# Patient Record
Sex: MALE | Race: WHITE | NOT HISPANIC OR LATINO
[De-identification: names, ages, dates, MRNs, and addresses within clinical notes are randomized per-mention and may not be internally consistent; named-entity substitution may affect disease eponyms.]

---

## 2020-04-23 PROBLEM — Z00.00 ENCOUNTER FOR PREVENTIVE HEALTH EXAMINATION: Status: ACTIVE | Noted: 2020-04-23

## 2020-06-29 ENCOUNTER — APPOINTMENT (OUTPATIENT)
Dept: CARDIOLOGY | Facility: CLINIC | Age: 70
End: 2020-06-29

## 2020-06-29 ENCOUNTER — APPOINTMENT (OUTPATIENT)
Dept: CARDIOLOGY | Facility: CLINIC | Age: 70
End: 2020-06-29
Payer: MEDICARE

## 2020-06-29 PROCEDURE — 93000 ELECTROCARDIOGRAM COMPLETE: CPT

## 2020-06-29 PROCEDURE — 99204 OFFICE O/P NEW MOD 45 MIN: CPT

## 2020-08-19 ENCOUNTER — APPOINTMENT (OUTPATIENT)
Dept: CARDIOLOGY | Facility: CLINIC | Age: 70
End: 2020-08-19
Payer: MEDICARE

## 2020-08-19 PROCEDURE — 93306 TTE W/DOPPLER COMPLETE: CPT

## 2020-08-24 ENCOUNTER — RESULT REVIEW (OUTPATIENT)
Age: 70
End: 2020-08-24

## 2020-08-24 ENCOUNTER — OUTPATIENT (OUTPATIENT)
Dept: OUTPATIENT SERVICES | Facility: HOSPITAL | Age: 70
LOS: 1 days | Discharge: HOME | End: 2020-08-24
Payer: MEDICARE

## 2020-08-24 DIAGNOSIS — R06.02 SHORTNESS OF BREATH: ICD-10-CM

## 2020-08-24 PROCEDURE — 78452 HT MUSCLE IMAGE SPECT MULT: CPT | Mod: 26

## 2020-10-01 ENCOUNTER — RECORD ABSTRACTING (OUTPATIENT)
Age: 70
End: 2020-10-01

## 2020-10-01 DIAGNOSIS — Z86.79 PERSONAL HISTORY OF OTHER DISEASES OF THE CIRCULATORY SYSTEM: ICD-10-CM

## 2020-10-01 DIAGNOSIS — Z86.69 PERSONAL HISTORY OF OTHER DISEASES OF THE NERVOUS SYSTEM AND SENSE ORGANS: ICD-10-CM

## 2020-10-01 DIAGNOSIS — R07.89 OTHER CHEST PAIN: ICD-10-CM

## 2020-10-01 DIAGNOSIS — Z78.9 OTHER SPECIFIED HEALTH STATUS: ICD-10-CM

## 2020-10-01 DIAGNOSIS — R94.30 ABNORMAL RESULT OF CARDIOVASCULAR FUNCTION STUDY, UNSPECIFIED: ICD-10-CM

## 2020-11-16 ENCOUNTER — APPOINTMENT (OUTPATIENT)
Dept: CARDIOLOGY | Facility: CLINIC | Age: 70
End: 2020-11-16
Payer: MEDICARE

## 2020-11-16 DIAGNOSIS — I10 ESSENTIAL (PRIMARY) HYPERTENSION: ICD-10-CM

## 2020-11-16 PROCEDURE — 93000 ELECTROCARDIOGRAM COMPLETE: CPT

## 2020-11-16 PROCEDURE — 99213 OFFICE O/P EST LOW 20 MIN: CPT

## 2020-11-16 NOTE — PHYSICAL EXAM
[General Appearance - Well Developed] : well developed [Normal Appearance] : normal appearance [Well Groomed] : well groomed [General Appearance - Well Nourished] : well nourished [No Deformities] : no deformities [General Appearance - In No Acute Distress] : no acute distress [Normal Conjunctiva] : the conjunctiva exhibited no abnormalities [Eyelids - No Xanthelasma] : the eyelids demonstrated no xanthelasmas [Normal Oral Mucosa] : normal oral mucosa [No Oral Pallor] : no oral pallor [No Oral Cyanosis] : no oral cyanosis [] : no respiratory distress [Heart Rate And Rhythm] : heart rate and rhythm were normal [Heart Sounds] : normal S1 and S2 [Murmurs] : no murmurs present [Arterial Pulses Normal] : the arterial pulses were normal [Edema] : no peripheral edema present [FreeTextEntry1] : No JVD

## 2020-11-16 NOTE — ASSESSMENT
[FreeTextEntry1] : HTN\par left nepherectomy\par Neuropathy\par Moh's surg on nose \par Lab Investigations	q waves III and F ? old no old EKG\par like to get old test\par stress thall(-)2019 and 2020   and echo 2020 aorta at sinus level 4.0 and MAC otherwise normal

## 2021-07-05 ENCOUNTER — RX RENEWAL (OUTPATIENT)
Age: 71
End: 2021-07-05

## 2022-06-30 ENCOUNTER — RX RENEWAL (OUTPATIENT)
Age: 72
End: 2022-06-30

## 2022-06-30 RX ORDER — AMLODIPINE BESYLATE 10 MG/1
10 TABLET ORAL DAILY
Qty: 90 | Refills: 3 | Status: ACTIVE | COMMUNITY
Start: 2021-07-05 | End: 1900-01-01

## 2022-07-05 ENCOUNTER — APPOINTMENT (OUTPATIENT)
Dept: ORTHOPEDIC SURGERY | Facility: CLINIC | Age: 72
End: 2022-07-05

## 2023-03-04 ENCOUNTER — APPOINTMENT (OUTPATIENT)
Dept: ORTHOPEDIC SURGERY | Facility: CLINIC | Age: 73
End: 2023-03-04
Payer: MEDICARE

## 2023-03-04 PROCEDURE — 73560 X-RAY EXAM OF KNEE 1 OR 2: CPT | Mod: LT

## 2023-03-04 PROCEDURE — 73610 X-RAY EXAM OF ANKLE: CPT | Mod: RT

## 2023-03-04 PROCEDURE — 99213 OFFICE O/P EST LOW 20 MIN: CPT

## 2023-03-04 NOTE — HISTORY OF PRESENT ILLNESS
[de-identified] : [age] several months of pain left knee going up and down stairs constant pain in his right ankle with some numbness weight down 205 recently diagnosed with  prostate cancer [facility] 9   has had a kidney removed in the past no allergies does not smoke no trauma resting it seems to help\par  history neuropathy

## 2023-03-04 NOTE — ASSESSMENT
[FreeTextEntry1] :  right now would focus on his workup for the prostate there was a calcific lesion distal 3rd femur would expect a bone scan to be done I do not really think it has anything to do with the prostate cancer heat Tylenol deferred on injection today for the knee or MRI I will see him back on an needed basis

## 2023-03-04 NOTE — IMAGING
[de-identified] :  pleasant slender ease examine some spasm in his back both hips good motion left knee some crepitus tenderness in the suprapatellar region extensor mechanism intact slight antalgic gait\par \par Right ankle good motion killing is intact some dysesthesia\par \par X-ray left knee mild moderate arthritis  the calcific deposit distal 3rd femur intramedullary appears benign\par \par Right ankle x-rays unremarkable

## 2023-03-04 NOTE — REASON FOR VISIT
[FreeTextEntry2] : patient is here for complaints of lower back pain, left knee pain and right ankle pain

## 2023-03-08 ENCOUNTER — FORM ENCOUNTER (OUTPATIENT)
Age: 73
End: 2023-03-08

## 2023-04-26 ENCOUNTER — APPOINTMENT (OUTPATIENT)
Dept: ORTHOPEDIC SURGERY | Facility: CLINIC | Age: 73
End: 2023-04-26
Payer: MEDICARE

## 2023-04-26 PROCEDURE — 73560 X-RAY EXAM OF KNEE 1 OR 2: CPT | Mod: RT

## 2023-04-26 PROCEDURE — 73030 X-RAY EXAM OF SHOULDER: CPT | Mod: LT

## 2023-04-26 PROCEDURE — 99213 OFFICE O/P EST LOW 20 MIN: CPT | Mod: 25

## 2023-04-26 PROCEDURE — 20610 DRAIN/INJ JOINT/BURSA W/O US: CPT | Mod: RT

## 2023-04-26 NOTE — REASON FOR VISIT
[FreeTextEntry2] : bilateral knee pain, left shoulder pain  Right knee pain is more recent was diagnosed with renal cell tumor to the lung left shoulder is giving him some clicking  lower back pain is manageable

## 2023-04-26 NOTE — IMAGING
[de-identified] :  pleasant slender ease examine some spasm in his back both hips good motion left knee some crepitus tenderness in the suprapatellar region extensor mechanism intact slight antalgic gait\par \par Right ankle good motion killing is intact some dysesthesia\par \par X-ray left knee mild moderate arthritis  the calcific deposit distal 3rd femur intramedullary appears benign\par \par Right ankle x-rays unremarkable\par  left shoulder impingement some crepitus\par \par X-rays unremarkable\par \par Right knee some tenderness on the joint line no swelling x-rays unremarkable

## 2023-04-26 NOTE — HISTORY OF PRESENT ILLNESS
[de-identified] : [age] several months of pain left knee going up and down stairs constant pain in his right ankle with some numbness weight down 205 recently diagnosed with  prostate cancer [facility] 9   has had a kidney removed in the past no allergies does not smoke no trauma resting it seems to help\par  history neuropathy

## 2023-04-26 NOTE — ASSESSMENT
[FreeTextEntry1] :  right now would focus on his workup for the prostate  the calcific lesion distal 3rd femur  was unremarkable   is being followed as well for suspected renal carcinoma to the lung heat Tylenol deferred  offered an injection today for the  right knee I will see him back on an needed basis the\par  the option of a  cortisone injection in the right   knee was discussed with the patient today.  risks and benefits were reviewed and  consent was given.  with sterile technique  through a lateral approach 5 cc dexamethasone (20 mg) and 5 cc 1%  lidocaine was infiltrated with good effect and a  Band-Aid applied ice was  recommended\par

## 2023-05-24 ENCOUNTER — APPOINTMENT (OUTPATIENT)
Dept: ORTHOPEDIC SURGERY | Facility: CLINIC | Age: 73
End: 2023-05-24
Payer: MEDICARE

## 2023-05-24 PROCEDURE — 20610 DRAIN/INJ JOINT/BURSA W/O US: CPT | Mod: RT

## 2023-05-24 PROCEDURE — 99213 OFFICE O/P EST LOW 20 MIN: CPT | Mod: 25

## 2023-05-24 NOTE — HISTORY OF PRESENT ILLNESS
[de-identified] : [age] several months of pain left knee going up and down stairs constant pain in his right ankle with some numbness weight down 205 recently diagnosed with  prostate cancer [facility] 9   has had a kidney removed in the past no allergies does not smoke no trauma resting it seems to help\par  history neuropathy

## 2023-05-24 NOTE — REASON FOR VISIT
[FreeTextEntry2] : bilateral knee pain, right ankle and left shoulder pain\par The right knee is little worse today did well from the left knee injection

## 2023-05-24 NOTE — IMAGING
[de-identified] :  pleasant slender ease examine some spasm in his back both hips good motion left knee some crepitus tenderness in the suprapatellar region extensor mechanism intact slight antalgic gait\par \par Right ankle good motion killing is intact some dysesthesia\par \par X-ray left knee mild moderate arthritis  the calcific deposit distal 3rd femur intramedullary appears benign\par \par Right ankle x-rays unremarkable\par  left shoulder impingement some crepitus\par \par X-rays unremarkable\par \par Right knee some tenderness on the joint line no swelling x-rays unremarkable

## 2023-05-24 NOTE — ASSESSMENT
[FreeTextEntry1] :   continues his workup for the prostate and  kidney\par   the calcific lesion distal 3rd femur  was unremarkable   \par is being followed as well for suspected renal carcinoma to the lung heat Tylenol deferred  offered an injection today for the  right knee I will see him back on an needed basis\par  \par  the option of a  cortisone injection in the right   knee was discussed with the patient today.  risks and benefits were reviewed and  consent was given.  with sterile technique  through a lateral approach 5 cc dexamethasone (20 mg) and 5 cc 1%  lidocaine was infiltrated with good effect and a  Band-Aid applied ice was  recommended\par

## 2023-12-01 ENCOUNTER — APPOINTMENT (OUTPATIENT)
Dept: ORTHOPEDIC SURGERY | Facility: CLINIC | Age: 73
End: 2023-12-01
Payer: MEDICARE

## 2023-12-01 PROCEDURE — 99213 OFFICE O/P EST LOW 20 MIN: CPT

## 2024-02-29 ENCOUNTER — APPOINTMENT (OUTPATIENT)
Dept: ORTHOPEDIC SURGERY | Facility: CLINIC | Age: 74
End: 2024-02-29
Payer: MEDICARE

## 2024-02-29 PROCEDURE — 99213 OFFICE O/P EST LOW 20 MIN: CPT

## 2024-02-29 NOTE — IMAGING
[de-identified] :  pleasant slender ease examine some spasm in his back both hips good motion left knee some crepitus tenderness in the suprapatellar region extensor mechanism intact slight antalgic gait\par  \par  Right ankle good motion killing is intact some dysesthesia\par  \par  X-ray left knee mild moderate arthritis  the calcific deposit distal 3rd femur intramedullary appears benign\par  \par  Right ankle x-rays unremarkable\par   left shoulder impingement some crepitus\par  \par  X-rays unremarkable\par  \par  Right knee some tenderness on the joint line no swelling x-rays unremarkable

## 2024-02-29 NOTE — ASSESSMENT
[FreeTextEntry1] : continues his workup for the prostate and kidney  starting RT   the calcific lesion distal 3rd femur was unremarkable  is being followed as well for renal carcinoma to the lung heat Tylenol deferred offered an injection today for the right knee  MRI right knee no tear   f/u 3 months  sone right elbow pain    heat and advil   did lose 10 pounds will get MRI left ankle

## 2024-02-29 NOTE — REASON FOR VISIT
[FreeTextEntry2] : Patient is here for bilateral knee pain, right ankle and left shoulder pain Lumbar epidural helped a little bit of his back pain but not the leg pain Some difficulty driving  He is planning to have radiation treatment now for his prostate cancer still a lot of numbness in the leg down into the calf and ankle

## 2024-02-29 NOTE — HISTORY OF PRESENT ILLNESS
[de-identified] : [age] several months of pain left knee going up and down stairs constant pain in his right ankle with some numbness weight down 205 recently diagnosed with  prostate cancer Xavier 9   has had a kidney removed in the past no allergies does not smoke no trauma resting it seems to help\par   history neuropathy

## 2024-05-09 ENCOUNTER — APPOINTMENT (OUTPATIENT)
Dept: ORTHOPEDIC SURGERY | Facility: CLINIC | Age: 74
End: 2024-05-09
Payer: MEDICARE

## 2024-05-09 DIAGNOSIS — G89.29 PAIN IN RIGHT ANKLE AND JOINTS OF RIGHT FOOT: ICD-10-CM

## 2024-05-09 DIAGNOSIS — M25.571 PAIN IN RIGHT ANKLE AND JOINTS OF RIGHT FOOT: ICD-10-CM

## 2024-05-09 DIAGNOSIS — M17.11 UNILATERAL PRIMARY OSTEOARTHRITIS, RIGHT KNEE: ICD-10-CM

## 2024-05-09 DIAGNOSIS — S83.231A COMPLEX TEAR OF MEDIAL MENISCUS, CURRENT INJURY, RIGHT KNEE, INITIAL ENCOUNTER: ICD-10-CM

## 2024-05-09 DIAGNOSIS — G60.9 HEREDITARY AND IDIOPATHIC NEUROPATHY, UNSPECIFIED: ICD-10-CM

## 2024-05-09 DIAGNOSIS — M17.12 UNILATERAL PRIMARY OSTEOARTHRITIS, LEFT KNEE: ICD-10-CM

## 2024-05-09 DIAGNOSIS — M77.8 OTHER ENTHESOPATHIES, NOT ELSEWHERE CLASSIFIED: ICD-10-CM

## 2024-05-09 PROCEDURE — 99213 OFFICE O/P EST LOW 20 MIN: CPT

## 2024-05-09 PROCEDURE — G2211 COMPLEX E/M VISIT ADD ON: CPT

## 2024-05-09 NOTE — HISTORY OF PRESENT ILLNESS
[de-identified] : [age] several months of pain left knee going up and down stairs constant pain in his right ankle with some numbness weight down 205 recently diagnosed with  prostate cancer Xavier 9   has had a kidney removed in the past no allergies does not smoke no trauma resting it seems to help\par   history neuropathy

## 2024-05-09 NOTE — REASON FOR VISIT
[FreeTextEntry2] : knee pain R>L  right ankle Had some treatments for neuropathy helped finish 30 radiation treatments for prostate cancer PSA 0 is going to be on hormones for a few months wrapping the knee and ankle

## 2024-05-09 NOTE — ASSESSMENT
[FreeTextEntry1] : continues his workup for the prostate and kidney  finished RT Remains on hormone treatment   the calcific lesion distal 3rd femur was unremarkable  is being followed as well for renal carcinoma to the lung heat Tylenol deferred  a knee  MRI right knee mm tear   f/u 2 months  sone right elbow pain    heat and advil   did lose 10 pounds did get  MRI left ankle non specific changes

## 2024-05-09 NOTE — IMAGING
[de-identified] :  pleasant slender ease examine some spasm in his back both hips good motion left knee some crepitus tenderness in the suprapatellar region extensor mechanism intact slight antalgic gait  Right ankle good motion killing is intact some dysesthesia  X-ray left knee mild moderate arthritis  the calcific deposit distal 3rd femur intramedullary appears benign  Right ankle x-rays unremarkable  left shoulder impingement some crepitus  X-rays unremarkable  Right knee some tenderness on the joint line no swelling x-rays unremarkable MRI right ankle 3/13/2024 arthritis midfoot peroneal tendinitis fasciitis

## 2024-07-16 ENCOUNTER — APPOINTMENT (OUTPATIENT)
Dept: ORTHOPEDIC SURGERY | Facility: CLINIC | Age: 74
End: 2024-07-16
Payer: MEDICARE

## 2024-07-16 DIAGNOSIS — M17.11 UNILATERAL PRIMARY OSTEOARTHRITIS, RIGHT KNEE: ICD-10-CM

## 2024-07-16 DIAGNOSIS — M25.571 PAIN IN RIGHT ANKLE AND JOINTS OF RIGHT FOOT: ICD-10-CM

## 2024-07-16 DIAGNOSIS — G60.9 HEREDITARY AND IDIOPATHIC NEUROPATHY, UNSPECIFIED: ICD-10-CM

## 2024-07-16 DIAGNOSIS — M17.12 UNILATERAL PRIMARY OSTEOARTHRITIS, LEFT KNEE: ICD-10-CM

## 2024-07-16 DIAGNOSIS — G89.29 PAIN IN RIGHT ANKLE AND JOINTS OF RIGHT FOOT: ICD-10-CM

## 2024-07-16 DIAGNOSIS — M77.8 OTHER ENTHESOPATHIES, NOT ELSEWHERE CLASSIFIED: ICD-10-CM

## 2024-07-16 DIAGNOSIS — S83.231A COMPLEX TEAR OF MEDIAL MENISCUS, CURRENT INJURY, RIGHT KNEE, INITIAL ENCOUNTER: ICD-10-CM

## 2024-07-16 PROCEDURE — 99213 OFFICE O/P EST LOW 20 MIN: CPT

## 2024-07-16 PROCEDURE — G2211 COMPLEX E/M VISIT ADD ON: CPT

## 2024-08-14 ENCOUNTER — APPOINTMENT (OUTPATIENT)
Dept: ORTHOPEDIC SURGERY | Facility: CLINIC | Age: 74
End: 2024-08-14

## 2024-08-14 DIAGNOSIS — M17.11 UNILATERAL PRIMARY OSTEOARTHRITIS, RIGHT KNEE: ICD-10-CM

## 2024-08-14 DIAGNOSIS — M25.571 PAIN IN RIGHT ANKLE AND JOINTS OF RIGHT FOOT: ICD-10-CM

## 2024-08-14 DIAGNOSIS — M17.12 UNILATERAL PRIMARY OSTEOARTHRITIS, LEFT KNEE: ICD-10-CM

## 2024-08-14 DIAGNOSIS — G89.29 PAIN IN RIGHT ANKLE AND JOINTS OF RIGHT FOOT: ICD-10-CM

## 2024-08-14 DIAGNOSIS — G60.9 HEREDITARY AND IDIOPATHIC NEUROPATHY, UNSPECIFIED: ICD-10-CM

## 2024-08-14 DIAGNOSIS — M77.8 OTHER ENTHESOPATHIES, NOT ELSEWHERE CLASSIFIED: ICD-10-CM

## 2024-08-14 DIAGNOSIS — S83.231A COMPLEX TEAR OF MEDIAL MENISCUS, CURRENT INJURY, RIGHT KNEE, INITIAL ENCOUNTER: ICD-10-CM

## 2024-08-14 PROCEDURE — 20610 DRAIN/INJ JOINT/BURSA W/O US: CPT | Mod: 50

## 2024-08-14 PROCEDURE — 99213 OFFICE O/P EST LOW 20 MIN: CPT | Mod: 25

## 2024-08-14 RX ORDER — MELOXICAM 15 MG/1
15 TABLET ORAL
Qty: 30 | Refills: 2 | Status: ACTIVE | COMMUNITY
Start: 2024-08-14 | End: 1900-01-01

## 2024-09-04 ENCOUNTER — APPOINTMENT (OUTPATIENT)
Dept: ORTHOPEDIC SURGERY | Facility: CLINIC | Age: 74
End: 2024-09-04

## 2024-11-07 ENCOUNTER — APPOINTMENT (OUTPATIENT)
Dept: ORTHOPEDIC SURGERY | Facility: CLINIC | Age: 74
End: 2024-11-07

## 2024-12-14 ENCOUNTER — APPOINTMENT (OUTPATIENT)
Dept: ORTHOPEDIC SURGERY | Facility: CLINIC | Age: 74
End: 2024-12-14
Payer: MEDICARE

## 2024-12-14 VITALS — WEIGHT: 200 LBS | BODY MASS INDEX: 24.87 KG/M2 | HEIGHT: 75 IN

## 2024-12-14 DIAGNOSIS — S39.012A STRAIN OF MUSCLE, FASCIA AND TENDON OF LOWER BACK, INITIAL ENCOUNTER: ICD-10-CM

## 2024-12-14 DIAGNOSIS — M54.2 CERVICALGIA: ICD-10-CM

## 2024-12-14 DIAGNOSIS — S80.01XA CONTUSION OF RIGHT KNEE, INITIAL ENCOUNTER: ICD-10-CM

## 2024-12-14 PROCEDURE — 73562 X-RAY EXAM OF KNEE 3: CPT | Mod: RT

## 2024-12-14 PROCEDURE — 99213 OFFICE O/P EST LOW 20 MIN: CPT

## 2024-12-14 PROCEDURE — 72110 X-RAY EXAM L-2 SPINE 4/>VWS: CPT

## 2024-12-14 PROCEDURE — 72050 X-RAY EXAM NECK SPINE 4/5VWS: CPT

## 2025-01-27 ENCOUNTER — APPOINTMENT (OUTPATIENT)
Dept: ORTHOPEDIC SURGERY | Facility: CLINIC | Age: 75
End: 2025-01-27
Payer: MEDICARE

## 2025-01-27 DIAGNOSIS — G60.9 HEREDITARY AND IDIOPATHIC NEUROPATHY, UNSPECIFIED: ICD-10-CM

## 2025-01-27 DIAGNOSIS — G89.29 PAIN IN RIGHT ANKLE AND JOINTS OF RIGHT FOOT: ICD-10-CM

## 2025-01-27 DIAGNOSIS — S83.231A COMPLEX TEAR OF MEDIAL MENISCUS, CURRENT INJURY, RIGHT KNEE, INITIAL ENCOUNTER: ICD-10-CM

## 2025-01-27 DIAGNOSIS — M77.8 OTHER ENTHESOPATHIES, NOT ELSEWHERE CLASSIFIED: ICD-10-CM

## 2025-01-27 DIAGNOSIS — M50.90 CERVICAL DISC DISORDER, UNSPECIFIED, UNSPECIFIED CERVICAL REGION: ICD-10-CM

## 2025-01-27 DIAGNOSIS — M25.571 PAIN IN RIGHT ANKLE AND JOINTS OF RIGHT FOOT: ICD-10-CM

## 2025-01-27 DIAGNOSIS — M54.2 CERVICALGIA: ICD-10-CM

## 2025-01-27 PROCEDURE — 99213 OFFICE O/P EST LOW 20 MIN: CPT

## 2025-01-27 PROCEDURE — G2211 COMPLEX E/M VISIT ADD ON: CPT

## 2025-02-03 ENCOUNTER — APPOINTMENT (OUTPATIENT)
Dept: ORTHOPEDIC SURGERY | Facility: CLINIC | Age: 75
End: 2025-02-03
Payer: MEDICARE

## 2025-02-03 DIAGNOSIS — M48.062 SPINAL STENOSIS, LUMBAR REGION WITH NEUROGENIC CLAUDICATION: ICD-10-CM

## 2025-02-03 PROCEDURE — 99214 OFFICE O/P EST MOD 30 MIN: CPT

## 2025-02-11 ENCOUNTER — APPOINTMENT (OUTPATIENT)
Dept: MRI IMAGING | Facility: CLINIC | Age: 75
End: 2025-02-11

## 2025-02-12 ENCOUNTER — APPOINTMENT (OUTPATIENT)
Dept: MRI IMAGING | Facility: CLINIC | Age: 75
End: 2025-02-12

## 2025-03-28 ENCOUNTER — APPOINTMENT (OUTPATIENT)
Dept: ORTHOPEDIC SURGERY | Facility: CLINIC | Age: 75
End: 2025-03-28
Payer: MEDICARE

## 2025-03-28 DIAGNOSIS — M50.90 CERVICAL DISC DISORDER, UNSPECIFIED, UNSPECIFIED CERVICAL REGION: ICD-10-CM

## 2025-03-28 DIAGNOSIS — M48.062 SPINAL STENOSIS, LUMBAR REGION WITH NEUROGENIC CLAUDICATION: ICD-10-CM

## 2025-03-28 PROCEDURE — 99215 OFFICE O/P EST HI 40 MIN: CPT

## 2025-04-28 ENCOUNTER — APPOINTMENT (OUTPATIENT)
Dept: ORTHOPEDIC SURGERY | Facility: CLINIC | Age: 75
End: 2025-04-28

## 2025-04-28 DIAGNOSIS — G89.29 PAIN IN RIGHT ANKLE AND JOINTS OF RIGHT FOOT: ICD-10-CM

## 2025-04-28 DIAGNOSIS — M17.11 UNILATERAL PRIMARY OSTEOARTHRITIS, RIGHT KNEE: ICD-10-CM

## 2025-04-28 DIAGNOSIS — S83.231A COMPLEX TEAR OF MEDIAL MENISCUS, CURRENT INJURY, RIGHT KNEE, INITIAL ENCOUNTER: ICD-10-CM

## 2025-04-28 DIAGNOSIS — M48.062 SPINAL STENOSIS, LUMBAR REGION WITH NEUROGENIC CLAUDICATION: ICD-10-CM

## 2025-04-28 DIAGNOSIS — M17.12 UNILATERAL PRIMARY OSTEOARTHRITIS, LEFT KNEE: ICD-10-CM

## 2025-04-28 DIAGNOSIS — G60.9 HEREDITARY AND IDIOPATHIC NEUROPATHY, UNSPECIFIED: ICD-10-CM

## 2025-04-28 DIAGNOSIS — M77.8 OTHER ENTHESOPATHIES, NOT ELSEWHERE CLASSIFIED: ICD-10-CM

## 2025-04-28 DIAGNOSIS — M50.90 CERVICAL DISC DISORDER, UNSPECIFIED, UNSPECIFIED CERVICAL REGION: ICD-10-CM

## 2025-04-28 DIAGNOSIS — M25.571 PAIN IN RIGHT ANKLE AND JOINTS OF RIGHT FOOT: ICD-10-CM

## 2025-04-28 PROCEDURE — 73560 X-RAY EXAM OF KNEE 1 OR 2: CPT | Mod: RT

## 2025-04-28 PROCEDURE — 99213 OFFICE O/P EST LOW 20 MIN: CPT | Mod: 25

## 2025-04-28 PROCEDURE — 20610 DRAIN/INJ JOINT/BURSA W/O US: CPT | Mod: RT

## 2025-07-18 ENCOUNTER — APPOINTMENT (OUTPATIENT)
Dept: ORTHOPEDIC SURGERY | Facility: CLINIC | Age: 75
End: 2025-07-18

## 2025-07-28 ENCOUNTER — APPOINTMENT (OUTPATIENT)
Dept: ORTHOPEDIC SURGERY | Facility: CLINIC | Age: 75
End: 2025-07-28

## 2025-08-25 ENCOUNTER — NON-APPOINTMENT (OUTPATIENT)
Age: 75
End: 2025-08-25

## 2025-08-27 ENCOUNTER — NON-APPOINTMENT (OUTPATIENT)
Age: 75
End: 2025-08-27

## 2025-08-27 ENCOUNTER — APPOINTMENT (OUTPATIENT)
Dept: NEUROSURGERY | Facility: CLINIC | Age: 75
End: 2025-08-27
Payer: MEDICARE

## 2025-08-27 VITALS — WEIGHT: 199 LBS | BODY MASS INDEX: 24.74 KG/M2 | HEIGHT: 75 IN

## 2025-08-27 DIAGNOSIS — G95.9 DISEASE OF SPINAL CORD, UNSPECIFIED: ICD-10-CM

## 2025-08-27 DIAGNOSIS — M48.02 SPINAL STENOSIS, CERVICAL REGION: ICD-10-CM

## 2025-08-27 PROCEDURE — 99204 OFFICE O/P NEW MOD 45 MIN: CPT
